# Patient Record
Sex: FEMALE | URBAN - METROPOLITAN AREA
[De-identification: names, ages, dates, MRNs, and addresses within clinical notes are randomized per-mention and may not be internally consistent; named-entity substitution may affect disease eponyms.]

---

## 2019-10-24 ENCOUNTER — APPOINTMENT (RX ONLY)
Dept: URBAN - METROPOLITAN AREA CLINIC 112 | Facility: CLINIC | Age: 39
Setting detail: DERMATOLOGY
End: 2019-10-24

## 2019-10-24 DIAGNOSIS — D17 BENIGN LIPOMATOUS NEOPLASM: ICD-10-CM

## 2019-10-24 PROBLEM — D48.5 NEOPLASM OF UNCERTAIN BEHAVIOR OF SKIN: Status: ACTIVE | Noted: 2019-10-24

## 2019-10-24 PROCEDURE — ? ORDER ULTRASOUND

## 2019-10-24 PROCEDURE — 99202 OFFICE O/P NEW SF 15 MIN: CPT

## 2019-10-24 PROCEDURE — ? DIAGNOSIS COMMENT

## 2019-10-24 PROCEDURE — ? COUNSELING

## 2019-10-24 NOTE — PROCEDURE: DIAGNOSIS COMMENT
Comment: Ordering ultrasound imaging to confirm diagnosis. Discussed excision once nodule is confirmed lipoma or cyst.
Detail Level: Simple

## 2019-10-24 NOTE — PROCEDURE: ORDER ULTRASOUND
Detail Level: Simple
Lesion Location: right posterior neck
Priority: normal
Provider: Zohra Jimenez MD
Ultrasound Protocol: Ultrasound of Subcutaneous Mass
Perform At: [facility]

## 2019-10-24 NOTE — HPI: SKIN LESION
Is This A New Presentation, Or A Follow-Up?: Skin Lesion
Additional History: Patient notes a subcutaneous nodule that will become tender and enlarged at times.